# Patient Record
Sex: MALE | Race: BLACK OR AFRICAN AMERICAN | NOT HISPANIC OR LATINO | Employment: UNEMPLOYED | URBAN - METROPOLITAN AREA
[De-identification: names, ages, dates, MRNs, and addresses within clinical notes are randomized per-mention and may not be internally consistent; named-entity substitution may affect disease eponyms.]

---

## 2024-03-05 ENCOUNTER — HOSPITAL ENCOUNTER (EMERGENCY)
Facility: HOSPITAL | Age: 34
Discharge: HOME | End: 2024-03-05
Attending: EMERGENCY MEDICINE | Admitting: EMERGENCY MEDICINE
Payer: MEDICAID

## 2024-03-05 VITALS
HEIGHT: 64 IN | WEIGHT: 185 LBS | BODY MASS INDEX: 31.58 KG/M2 | DIASTOLIC BLOOD PRESSURE: 86 MMHG | OXYGEN SATURATION: 93 % | RESPIRATION RATE: 18 BRPM | HEART RATE: 121 BPM | SYSTOLIC BLOOD PRESSURE: 132 MMHG | TEMPERATURE: 98.4 F

## 2024-03-05 DIAGNOSIS — F10.920 ACUTE ALCOHOLIC INTOXICATION WITHOUT COMPLICATION: Primary | ICD-10-CM

## 2024-03-05 LAB — GLUCOSE BLDC GLUCOMTR-MCNC: 113 MG/DL (ref 70–105)

## 2024-03-05 PROCEDURE — 99282 EMERGENCY DEPT VISIT SF MDM: CPT

## 2024-03-05 PROCEDURE — 82948 REAGENT STRIP/BLOOD GLUCOSE: CPT | Performed by: EMERGENCY MEDICINE

## 2024-03-05 NOTE — ED PROVIDER NOTES
"Subjective   History of Present Illness  33-year-old male presents with police for alcohol intoxication.  Patient denies any trauma.  States he has no complaints.  States he is a diabetic.  States he is a type II.  Denies chest pain, shortness of breath, abdominal pain, headache.  Review of Systems  See HPI.  No past medical history on file.    No Known Allergies    No past surgical history on file.    No family history on file.    Social History     Socioeconomic History    Marital status: Single           Objective   Physical Exam  No acute distress, appears intoxicated, tachycardic rate regular rhythm, abdomen soft and nontender, clear to auscultation bilaterally, alert and oriented, moist mucous membranes.  Procedures           ED Course      /86   Pulse (!) 121   Temp 98.4 °F (36.9 °C) (Oral)   Resp 18   Ht 162.6 cm (64\")   Wt 83.9 kg (185 lb)   SpO2 93%   BMI 31.76 kg/m²   Labs Reviewed   POCT GLUCOSE FINGERSTICK - Abnormal; Notable for the following components:       Result Value    Glucose 113 (*)     All other components within normal limits     Medications - No data to display  No radiology results for the last day                                         Medical Decision Making  Problems Addressed:  Acute alcoholic intoxication without complication: complicated acute illness or injury    Amount and/or Complexity of Data Reviewed  Labs: ordered.    Patient nontoxic-appearing.  Has no complaints.  Glucose checked and unremarkable.  Will discharge in police custody.    Final diagnoses:   Acute alcoholic intoxication without complication       ED Disposition  ED Disposition       ED Disposition   Discharge    Condition   Stable    Comment   --               [facility] [place] [id number]  [phone]  In 3 days           Medication List      No changes were made to your prescriptions during this visit.            Manny Leon MD  03/05/24 0815    "